# Patient Record
Sex: FEMALE | Race: WHITE | NOT HISPANIC OR LATINO | Employment: UNEMPLOYED | URBAN - METROPOLITAN AREA
[De-identification: names, ages, dates, MRNs, and addresses within clinical notes are randomized per-mention and may not be internally consistent; named-entity substitution may affect disease eponyms.]

---

## 2017-06-30 ENCOUNTER — HOSPITAL ENCOUNTER (EMERGENCY)
Facility: HOSPITAL | Age: 22
Discharge: HOME | End: 2017-06-30
Attending: EMERGENCY MEDICINE

## 2017-06-30 VITALS
DIASTOLIC BLOOD PRESSURE: 72 MMHG | WEIGHT: 276 LBS | HEART RATE: 91 BPM | TEMPERATURE: 98 F | RESPIRATION RATE: 18 BRPM | HEIGHT: 66 IN | OXYGEN SATURATION: 96 % | SYSTOLIC BLOOD PRESSURE: 152 MMHG | BODY MASS INDEX: 44.36 KG/M2

## 2017-06-30 DIAGNOSIS — M25.511 ACUTE PAIN OF RIGHT SHOULDER: ICD-10-CM
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
## 2017-06-30 DIAGNOSIS — S06.0XAA CONCUSSION: ICD-10-CM

## 2017-06-30 DIAGNOSIS — S93.401A SPRAIN OF RIGHT ANKLE, UNSPECIFIED LIGAMENT, INITIAL ENCOUNTER: Primary | ICD-10-CM

## 2017-06-30 LAB

## 2017-06-30 PROCEDURE — 81025 URINE PREGNANCY TEST: CPT

## 2017-06-30 PROCEDURE — 81003 URINALYSIS AUTO W/O SCOPE: CPT

## 2017-06-30 PROCEDURE — 99285 EMERGENCY DEPT VISIT HI MDM: CPT

## 2017-06-30 RX ORDER — HYDROCODONE BITARTRATE AND ACETAMINOPHEN 5; 325 MG/1; MG/1
1 TABLET ORAL EVERY 4 HOURS PRN
Qty: 10 TABLET | Refills: 0 | Status: SHIPPED | OUTPATIENT
Start: 2017-06-30

## 2017-06-30 NOTE — ED PROVIDER NOTES
"SCRIBE #1 NOTE: I, Jerel Atwood, am scribing for, and in the presence of, Bryce Acevedo, I have scribed the entire note.      History      Chief Complaint   Patient presents with    Fall     pt reports falling down stairs, c/o pain with swelling to right ankle       Review of patient's allergies indicates:  No Known Allergies     HPI   HPI    6/30/2017, 5:08 PM   History obtained from the patient      History of Present Illness: Dalila Hilton is a 21 y.o. female patient who presents to the Emergency Department for evaluation after a fall onset this A.M. Pt reports that she fell down 3 stairs and hit her head on a wooden banister. Pt complains of right ankle pain and right shoulder pain. Symptoms are constant and moderate in severity.  No mitigating or exacerbating factors reported. Pt reports that she was "dazed and lost her vision momentarily" after the fall. Associated sxs include increased fatigue. Patient denies any LOC, numbness, weakness, dizziness, back pain, neck pain, knee pain, SOB, chest pain, n/v/d, and all other sxs at this time. No further complaints or concerns at this time.         Arrival mode: Personal vehicle    PCP: Primary Doctor No       Past Medical History:  History reviewed. No pertinent past medical history.    Past Surgical History:  History reviewed. No pertinent surgical history.      Family History:  History reviewed. No pertinent family history.    Social History:  Social History     Social History Main Topics    Smoking status: Never Smoker    Smokeless tobacco: Never Used    Alcohol use Unknown    Drug use: Unknown    Sexual activity: Unknown       ROS   Review of Systems   Constitutional: Positive for fatigue. Negative for chills, diaphoresis and fever.   HENT: Negative for sore throat.    Respiratory: Negative for cough and shortness of breath.    Cardiovascular: Negative for chest pain and palpitations.   Gastrointestinal: Negative for abdominal pain, " constipation, diarrhea, nausea and vomiting.   Genitourinary: Negative for difficulty urinating, dysuria, frequency, hematuria and urgency.   Musculoskeletal: Negative for back pain, myalgias, neck pain and neck stiffness.        + right ankle pain  + right shoulder pain  - knee pain   Skin: Negative for rash.   Neurological: Negative for dizziness, syncope, speech difficulty, weakness, light-headedness, numbness and headaches.   Hematological: Does not bruise/bleed easily.   All other systems reviewed and are negative.      Physical Exam      Initial Vitals [06/30/17 1646]   BP Pulse Resp Temp SpO2   (!) 152/72 91 18 98.4 °F (36.9 °C) 96 %      MAP       98.67          Physical Exam  Nursing Notes and Vital Signs Reviewed.  Constitutional: Patient is in no apparent distress. Well-developed and well-nourished.  Head: Normocephalic.   Eyes: PERRL. EOM intact. Conjunctivae are not pale. No scleral icterus.  ENT: Mucous membranes are moist. Oropharynx is clear and symmetric.    Neck: Supple. Full ROM. No lymphadenopathy.  Cardiovascular: Regular rate. Regular rhythm. No murmurs, rubs, or gallops. Distal pulses are 2+ and symmetric.  Pulmonary/Chest: No respiratory distress. Clear to auscultation bilaterally. No wheezing, rales, or rhonchi.  Abdominal: Soft and non-distended.  There is no tenderness.  No rebound, guarding, or rigidity. Good bowel sounds.  Genitourinary: No CVA tenderness  Musculoskeletal: Moves all extremities. No obvious deformities. Edema and abrasion to lateral malleolus of right ankle.  No calf tenderness. Tenderness to right scapula.   Skin: Warm and dry. Hematoma and abrasion to left temple.   Neurological:  Alert, awake, and appropriate.  Normal speech.  No acute focal neurological deficits are appreciated.  Psychiatric: Normal affect. Good eye contact. Appropriate in content.    ED Course    Procedures  ED Vital Signs:  Vitals:    06/30/17 1646   BP: (!) 152/72   Pulse: 91   Resp: 18   Temp:  "98.4 °F (36.9 °C)   TempSrc: Oral   SpO2: 96%   Weight: 125.2 kg (276 lb)   Height: 5' 6" (1.676 m)       Abnormal Lab Results:  Labs Reviewed   URINALYSIS   PREGNANCY TEST, URINE RAPID        All Lab Results:  No results found for this or any previous visit.      Imaging Results:  Imaging Results          CT Head Without Contrast (Final result)  Result time 06/30/17 17:52:02    Final result by Dev Arciniega Jr., MD (06/30/17 17:52:02)                 Impression:     No acute or significant intracranial CT abnormality.    All CT scans at this facility use dose modulation, iterative reconstruction, and/or weight base dosing when appropriate to reduce radiation dose to as low as reasonably achievable.       Electronically signed by: DEV ARCINIEGA M.D.  Date:     06/30/17  Time:    17:52              Narrative:    EXAM: [id number] HEAD WITHOUT CONTRAST    CLINICAL HISTORY: Concussion.    TECHNIQUE: Contiguous axial images were obtained from the skull base through the vertex without intravenous contrast.    COMPARISON: None.    FINDINGS: No intracranial hemorrhage. No mass effect or midline shift. No extra axial fluid collections. No areas of abnormal parenchymal attenuation. The ventricles and sulci are normal in size and configuration. There is no evidence of hydrocephalus. The pineal region is unremarkable. The posterior fossa structures are grossly unremarkable within the limits of CT scan. The paranasal sinuses and mastoid air cells are clear. No fractures are identified. No concerning osseous lesions.                             X-Ray Ankle Complete Right (Final result)  Result time 06/30/17 17:58:36    Final result by Jennifer Davila III, MD (06/30/17 17:58:36)                 Impression:     Lateral soft tissue swelling.  No acute osseous abnormality identified.          Electronically signed by: JENNIFER DAVILA MD  Date:     06/30/17  Time:    17:58              Narrative:    XR ANKLE COMPLETE 3 VIEW " RIGHT    Clinical history:   W10.8XXA Fall (on) (from) other stairs and steps, initial encounter; right ankle injury    Findings: There is soft tissue swelling in the lateral ankle.  No fracture is identified. Joint alignment is anatomic. The joint spaces appear relatively well maintained.No osteochondral defect identified.                             X-Ray Shoulder Trauma Right (Final result)  Result time 06/30/17 17:59:30    Final result by Jennifer Davila III, MD (06/30/17 17:59:30)                 Impression:     No acute abnormality identified.        Electronically signed by: JENNIFER DAVILA MD  Date:     06/30/17  Time:    17:59              Narrative:    XR SHOULDER TRAUMA 3 VIEW RIGHT    Clinical history: W10.8XXA Fall (on) (from) other stairs and steps, initial encounter; right shoulder injury    Findings: No fracture is identified. Joint alignment is anatomic. The joint spaces appear relatively well maintained.                                      The Emergency Provider reviewed the vital signs and test results, which are outlined above.    ED Discussion     6:31 PM:  Discussed with pt all pertinent ED information and results. Discussed pt dx of sprain of right ankle, unspecified ligament.  and plan of tx. Gave pt all f/u and return to the ED instructions. All questions and concerns were addressed at this time. Pt expresses understanding of information and instructions, and is comfortable with plan to discharge. Pt is stable for discharge.    I discussed with patient and/or family/caretaker that evaluation in the ED does not suggest any emergent or life threatening medical conditions requiring immediate intervention beyond what was provided in the ED, and I believe patient is safe for discharge.  Regardless, an unremarkable evaluation in the ED does not preclude the development or presence of a serious of life threatening condition. As such, patient was instructed to return immediately for any worsening or  change in current symptoms.        ED Medication(s):  Medications - No data to display    New Prescriptions    HYDROCODONE-ACETAMINOPHEN 5-325MG (NORCO) 5-325 MG PER TABLET    Take 1 tablet by mouth every 4 (four) hours as needed.       Follow-up Information     Orthopedics. Call in 3 days.    Why:  As needed           PCP. Call in 2 days.           [facility] - .    Specialty:  Emergency Medicine  Why:  If symptoms worsen  Contact information:  [address]  [facility] [address] [phone]                   Medical Decision Making    Medical Decision Making:   Clinical Tests:   Lab Tests: Ordered and Reviewed  Radiological Study: Ordered and Reviewed           Scribe Attestation:   Scribe #1: I performed the above scribed service and the documentation accurately describes the services I performed. I attest to the accuracy of the note.    Attending:   Physician Attestation Statement for Scribe #1: I, Bryce Acevedo,*, personally performed the services described in this documentation, as scribed by Jerel Atwood, in my presence, and it is both accurate and complete.          Clinical Impression       ICD-10-CM ICD-9-CM   1. Sprain of right ankle, unspecified ligament, initial encounter S93.401A 845.00   2. Concussion S06.0X9A 850.9   3. Fall (on) (from) other stairs and steps, initial encounter W10.8XXA E880.9   4. Acute pain of right shoulder M25.511 719.41       Disposition:   Disposition: Discharged  Condition: Stable         Bryce Acevedo MD  06/30/17 [id number]

## 2017-06-30 NOTE — ED TRIAGE NOTES
Slipped going down the stairs and twisted right ankle then fell hurt right shoulder and hit left forehead

## 2022-03-24 ENCOUNTER — OFFICE VISIT (OUTPATIENT)
Dept: URGENT CARE | Facility: CLINIC | Age: 27
End: 2022-03-24
Payer: COMMERCIAL

## 2022-03-24 VITALS
HEART RATE: 72 BPM | SYSTOLIC BLOOD PRESSURE: 114 MMHG | BODY MASS INDEX: 45.99 KG/M2 | DIASTOLIC BLOOD PRESSURE: 64 MMHG | OXYGEN SATURATION: 97 % | WEIGHT: 293 LBS | HEIGHT: 67 IN | TEMPERATURE: 98 F | RESPIRATION RATE: 16 BRPM

## 2022-03-24 DIAGNOSIS — M54.50 ACUTE LEFT-SIDED LOW BACK PAIN WITHOUT SCIATICA: Primary | ICD-10-CM

## 2022-03-24 PROCEDURE — 3078F DIAST BP <80 MM HG: CPT | Mod: CPTII,S$GLB,, | Performed by: PHYSICIAN ASSISTANT

## 2022-03-24 PROCEDURE — 96372 THER/PROPH/DIAG INJ SC/IM: CPT | Mod: S$GLB,,, | Performed by: PHYSICIAN ASSISTANT

## 2022-03-24 PROCEDURE — 99214 PR OFFICE/OUTPT VISIT, EST, LEVL IV, 30-39 MIN: ICD-10-PCS | Mod: 25,S$GLB,, | Performed by: PHYSICIAN ASSISTANT

## 2022-03-24 PROCEDURE — 96372 PR INJECTION,THERAP/PROPH/DIAG2ST, IM OR SUBCUT: ICD-10-PCS | Mod: S$GLB,,, | Performed by: PHYSICIAN ASSISTANT

## 2022-03-24 PROCEDURE — 3074F PR MOST RECENT SYSTOLIC BLOOD PRESSURE < 130 MM HG: ICD-10-PCS | Mod: CPTII,S$GLB,, | Performed by: PHYSICIAN ASSISTANT

## 2022-03-24 PROCEDURE — 1160F PR REVIEW ALL MEDS BY PRESCRIBER/CLIN PHARMACIST DOCUMENTED: ICD-10-PCS | Mod: CPTII,S$GLB,, | Performed by: PHYSICIAN ASSISTANT

## 2022-03-24 PROCEDURE — 1160F RVW MEDS BY RX/DR IN RCRD: CPT | Mod: CPTII,S$GLB,, | Performed by: PHYSICIAN ASSISTANT

## 2022-03-24 PROCEDURE — 1159F PR MEDICATION LIST DOCUMENTED IN MEDICAL RECORD: ICD-10-PCS | Mod: CPTII,S$GLB,, | Performed by: PHYSICIAN ASSISTANT

## 2022-03-24 PROCEDURE — 3008F BODY MASS INDEX DOCD: CPT | Mod: CPTII,S$GLB,, | Performed by: PHYSICIAN ASSISTANT

## 2022-03-24 PROCEDURE — 3008F PR BODY MASS INDEX (BMI) DOCUMENTED: ICD-10-PCS | Mod: CPTII,S$GLB,, | Performed by: PHYSICIAN ASSISTANT

## 2022-03-24 PROCEDURE — 3078F PR MOST RECENT DIASTOLIC BLOOD PRESSURE < 80 MM HG: ICD-10-PCS | Mod: CPTII,S$GLB,, | Performed by: PHYSICIAN ASSISTANT

## 2022-03-24 PROCEDURE — 3074F SYST BP LT 130 MM HG: CPT | Mod: CPTII,S$GLB,, | Performed by: PHYSICIAN ASSISTANT

## 2022-03-24 PROCEDURE — 99214 OFFICE O/P EST MOD 30 MIN: CPT | Mod: 25,S$GLB,, | Performed by: PHYSICIAN ASSISTANT

## 2022-03-24 PROCEDURE — 1159F MED LIST DOCD IN RCRD: CPT | Mod: CPTII,S$GLB,, | Performed by: PHYSICIAN ASSISTANT

## 2022-03-24 RX ORDER — METHOCARBAMOL 500 MG/1
500 TABLET, FILM COATED ORAL 3 TIMES DAILY
Qty: 21 TABLET | Refills: 0 | Status: SHIPPED | OUTPATIENT
Start: 2022-03-24 | End: 2022-03-31

## 2022-03-24 RX ORDER — ESCITALOPRAM OXALATE 10 MG/1
1 TABLET ORAL DAILY
COMMUNITY
Start: 2021-10-15

## 2022-03-24 RX ORDER — KETOROLAC TROMETHAMINE 30 MG/ML
30 INJECTION, SOLUTION INTRAMUSCULAR; INTRAVENOUS
Status: COMPLETED | OUTPATIENT
Start: 2022-03-24 | End: 2022-03-24

## 2022-03-24 RX ADMIN — KETOROLAC TROMETHAMINE 30 MG: 30 INJECTION, SOLUTION INTRAMUSCULAR; INTRAVENOUS at 04:03

## 2022-03-24 NOTE — LETTER
March 24, 2022      [facility]  [address] [place] [address] [address]  [place] [address]  Phone: [phone]  Fax: [phone]       Patient: Dalila Hilton   YOB: 1995  Date of Visit: 03/24/2022    To Whom It May Concern:    Paco Hilton  was at [facility] on 03/24/2022. The patient may return to work/school on 3/26/22 with restrictions (light duty x1 week no heavy lifting greater than 10 lb then may resume back to normal activity thereafter). If you have any questions or concerns, or if I can be of further assistance, please do not hesitate to contact me.    Sincerely,    Vivian Mares PA-C

## 2022-03-24 NOTE — PROGRESS NOTES
"Subjective:       Patient ID: Dalila Hilton is a 26 y.o. female.    Vitals:  height is 5' 7" (1.702 m) and weight is 137 kg (302 lb) (abnormal). Her tympanic temperature is 97.6 °F (36.4 °C). Her blood pressure is 114/64 and her pulse is 72. Her respiration is 16 and oxygen saturation is 97%.     Chief Complaint: Back Pain    Pt present for progressive back pain that started yesterday. Pt states that since 2017 she gets pain in her lower back. She has had x-rays and that came back normal per the patient. Pt state that she took acetaminophen and it provided mild relief.   Denies any trauma, bowel/bladder incontinence, or urinary issues.    Back Pain  This is a new problem. The current episode started more than 1 year ago. The problem occurs constantly. The problem has been gradually worsening since onset. The quality of the pain is described as aching. The symptoms are aggravated by bending, twisting and standing. Stiffness is present all day. Pertinent negatives include no abdominal pain, bladder incontinence, bowel incontinence, chest pain, dysuria, fever, headaches, leg pain, numbness, pelvic pain, perianal numbness, tingling, weakness or weight loss. Treatments tried: acetaminophen. The treatment provided mild relief.       Constitution: Negative for fever.   Cardiovascular: Negative for chest pain.   Gastrointestinal: Negative for abdominal pain and bowel incontinence.   Genitourinary: Negative for dysuria, bladder incontinence and pelvic pain.   Musculoskeletal: Positive for back pain.   Neurological: Negative for headaches and numbness.       Objective:       Vitals:    03/24/22 1550   BP: 114/64   Pulse: 72   Resp: 16   Temp: 97.6 °F (36.4 °C)   TempSrc: Tympanic   SpO2: 97%   Weight: (!) 137 kg (302 lb)   Height: 5' 7" (1.702 m)       Physical Exam   Constitutional: She is oriented to person, place, and time. She appears well-developed. She is cooperative. No distress. obesity  HENT:   Head: " Normocephalic and atraumatic.   Nose: Nose normal.   Mouth/Throat: Oropharynx is clear and moist and mucous membranes are normal.   Eyes: Conjunctivae and lids are normal.   Neck: Trachea normal and phonation normal. Neck supple.   Cardiovascular: Normal rate, regular rhythm and normal pulses.   Pulmonary/Chest: Effort normal.   Abdominal: Normal appearance and bowel sounds are normal. She exhibits no abdominal bruit, no pulsatile midline mass and no mass. Soft. There is no abdominal tenderness. There is no rebound, no guarding, no left CVA tenderness and no right CVA tenderness.   Musculoskeletal: Normal range of motion.         General: No deformity. Normal range of motion.      Thoracic back: Normal.      Lumbar back: She exhibits tenderness. She exhibits normal range of motion, no bony tenderness, no swelling, no edema, no deformity, no laceration and no spasm.        Back:       Right lower leg: No edema.      Left lower leg: No edema.      Comments: No obvious step-offs or deformities  No rash    Patient tried to reposition herself on the exam table and pain was reproduce that stiffened patient in current position.  After several seconds, patient was able to slowly move out of this position.   Neurological: no focal deficit. She is alert, oriented to person, place, and time and at baseline. She has normal strength and normal reflexes. She displays no weakness. No cranial nerve deficit or sensory deficit. Coordination and gait normal.   Skin: Skin is warm, dry, intact, not diaphoretic and no rash. Capillary refill takes less than 2 seconds.   Psychiatric: Her speech is normal and behavior is normal. Judgment and thought content normal.   Nursing note and vitals reviewed.        Assessment:       1. Acute left-sided low back pain without sciatica          Plan:         Acute left-sided low back pain without sciatica  -     ketorolac injection 30 mg  -     methocarbamoL (ROBAXIN) 500 MG Tab; Take 1 tablet (500 mg  total) by mouth 3 (three) times daily. for 7 days  Dispense: 21 tablet; Refill: 0         Patient Instructions   Please avoid heavy lifting and strenuous exercise for the next several days.    Alternate heat and ice for first 48 hours then  apply heat. You may do gently stretching within your limits of pain.  *Moist warm compresss to area several times daily.  May use a heating pad on LOW to provide heat over a towel which was dampended with warm water.   DO NOT FALL ASLEEP WITH HEATING PAD ON.  Do not stay in one position to long.  When sleeping on your back place a pillow under knees to reduce tension on back.    If sleeping on your side, place pillow between knees to keep spine in better alinement.    Wear supportive shoes such as tennis shoes for support of the lower back.  Take any medication as directed.      30 mg Toradol injection given in clinic today.  Patient denies any kidney, liver, or GI issues.   Do not take additional NSAIDs (ibuprofen, naproxen, clinoril, etc) while taking this medication.    You have been prescribed Robaxin for muscle pain.  This medication causes drowsiness.  Do not drink alcohol or operate motor vehicles while taking.      If you were not prescribed an anti-inflammatory medication, and if you do not have any history of stomach/intestinal ulcers, or kidney disease, or are not taking a blood thinner such as Coumadin, Plavix, Pradaxa, Eloquis, or Xaralta for example, it is OK to take over the counter Ibuprofen or Advil or Motrin or Aleve as directed.  Do not take these medications on an empty stomach.    If you were prescribed a narcotic medication, do not drive or operate heavy equipment or machinery while taking these medications.      Please follow-up with your primary care provider if your symptoms continue.    Go to the emergency department for any new or worsening symptoms including but not limited to: loss of control of your bowel and/or bladder, sensation loss in between  your legs by your genitalia and/or rectum.       - You must understand that you have received an Urgent Care treatment only and that you may be released before all of your medical problems are known or treated.   - You, the patient, will arrange for follow up care as instructed with your primary care provider or recommended specialist.   - If your condition worsens or fails to improve we recommend that you receive another evaluation at the ER immediately or contact your PCP to discuss your concerns, or return here.   - Please do not drive or make any important decisions for 24 hours if you have received any pain medications, sedatives or mood altering drugs during your visit.    Disclaimer: This document was drafted with the use of a voice recognition device and is likely to have sound alike errors.           Medical Decision Making:   Urgent Care Management:  30 mg Toradol injection given in clinic today.  Patient denies any kidney, liver, or GI issues.    Discussed importance of back posture and losing weight to lessen the strain on joints.  Discussed proper lifting and conservative care for pain management.  Explained that she should follow-up with orthopedist if the symptoms continue or go to the emergency room for any worsening emergent symptoms.

## 2022-03-24 NOTE — PATIENT INSTRUCTIONS
Please avoid heavy lifting and strenuous exercise for the next several days.    Alternate heat and ice for first 48 hours then  apply heat. You may do gently stretching within your limits of pain.  *Moist warm compresss to area several times daily.  May use a heating pad on LOW to provide heat over a towel which was dampended with warm water.   DO NOT FALL ASLEEP WITH HEATING PAD ON.  Do not stay in one position to long.  When sleeping on your back place a pillow under knees to reduce tension on back.    If sleeping on your side, place pillow between knees to keep spine in better alinement.    Wear supportive shoes such as tennis shoes for support of the lower back.  Take any medication as directed.      30 mg Toradol injection given in clinic today.  Patient denies any kidney, liver, or GI issues.   Do not take additional NSAIDs (ibuprofen, naproxen, clinoril, etc) while taking this medication.    You have been prescribed Robaxin for muscle pain.  This medication causes drowsiness.  Do not drink alcohol or operate motor vehicles while taking.      If you were not prescribed an anti-inflammatory medication, and if you do not have any history of stomach/intestinal ulcers, or kidney disease, or are not taking a blood thinner such as Coumadin, Plavix, Pradaxa, Eloquis, or Xaralta for example, it is OK to take over the counter Ibuprofen or Advil or Motrin or Aleve as directed.  Do not take these medications on an empty stomach.    If you were prescribed a narcotic medication, do not drive or operate heavy equipment or machinery while taking these medications.      Please follow-up with your primary care provider if your symptoms continue.    Go to the emergency department for any new or worsening symptoms including but not limited to: loss of control of your bowel and/or bladder, sensation loss in between your legs by your genitalia and/or rectum.       - You must understand that you have received an Urgent Care  treatment only and that you may be released before all of your medical problems are known or treated.   - You, the patient, will arrange for follow up care as instructed with your primary care provider or recommended specialist.   - If your condition worsens or fails to improve we recommend that you receive another evaluation at the ER immediately or contact your PCP to discuss your concerns, or return here.   - Please do not drive or make any important decisions for 24 hours if you have received any pain medications, sedatives or mood altering drugs during your visit.    Disclaimer: This document was drafted with the use of a voice recognition device and is likely to have sound alike errors.